# Patient Record
Sex: FEMALE | Race: WHITE | NOT HISPANIC OR LATINO | Employment: OTHER | URBAN - METROPOLITAN AREA
[De-identification: names, ages, dates, MRNs, and addresses within clinical notes are randomized per-mention and may not be internally consistent; named-entity substitution may affect disease eponyms.]

---

## 2020-08-06 ENCOUNTER — OFFICE VISIT (OUTPATIENT)
Dept: PODIATRY | Facility: CLINIC | Age: 65
End: 2020-08-06
Payer: MEDICAID

## 2020-08-06 VITALS
HEIGHT: 64 IN | WEIGHT: 209.19 LBS | DIASTOLIC BLOOD PRESSURE: 67 MMHG | BODY MASS INDEX: 35.71 KG/M2 | SYSTOLIC BLOOD PRESSURE: 116 MMHG | HEART RATE: 86 BPM

## 2020-08-06 DIAGNOSIS — B35.1 ONYCHOMYCOSIS: Primary | ICD-10-CM

## 2020-08-06 DIAGNOSIS — I73.9 PERIPHERAL ARTERIAL DISEASE: ICD-10-CM

## 2020-08-06 DIAGNOSIS — I73.9 ARTERIAL INSUFFICIENCY OF LOWER EXTREMITY: ICD-10-CM

## 2020-08-06 PROCEDURE — 11721 DEBRIDE NAIL 6 OR MORE: CPT | Mod: S$PBB,,, | Performed by: PODIATRIST

## 2020-08-06 PROCEDURE — 99203 OFFICE O/P NEW LOW 30 MIN: CPT | Mod: PBBFAC | Performed by: PODIATRIST

## 2020-08-06 PROCEDURE — 99203 OFFICE O/P NEW LOW 30 MIN: CPT | Mod: 25,S$PBB,, | Performed by: PODIATRIST

## 2020-08-06 PROCEDURE — 99999 PR PBB SHADOW E&M-NEW PATIENT-LVL III: CPT | Mod: PBBFAC,,, | Performed by: PODIATRIST

## 2020-08-06 PROCEDURE — 11721 PR DEBRIDEMENT OF NAILS, 6 OR MORE: ICD-10-PCS | Mod: S$PBB,,, | Performed by: PODIATRIST

## 2020-08-06 PROCEDURE — 11721 DEBRIDE NAIL 6 OR MORE: CPT | Mod: Q8,PBBFAC | Performed by: PODIATRIST

## 2020-08-06 PROCEDURE — 99999 PR PBB SHADOW E&M-NEW PATIENT-LVL III: ICD-10-PCS | Mod: PBBFAC,,, | Performed by: PODIATRIST

## 2020-08-06 PROCEDURE — 99203 PR OFFICE/OUTPT VISIT, NEW, LEVL III, 30-44 MIN: ICD-10-PCS | Mod: 25,S$PBB,, | Performed by: PODIATRIST

## 2020-08-06 RX ORDER — LEVOTHYROXINE SODIUM 125 UG/1
TABLET ORAL
COMMUNITY

## 2020-08-06 RX ORDER — TIOTROPIUM BROMIDE 18 UG/1
CAPSULE ORAL; RESPIRATORY (INHALATION)
COMMUNITY

## 2020-08-06 RX ORDER — ATORVASTATIN CALCIUM 10 MG/1
TABLET, FILM COATED ORAL
COMMUNITY

## 2020-08-06 RX ORDER — RISPERIDONE 3 MG/1
TABLET ORAL
COMMUNITY

## 2020-08-06 RX ORDER — OXYBUTYNIN CHLORIDE 15 MG/1
TABLET, EXTENDED RELEASE ORAL
COMMUNITY

## 2020-08-06 RX ORDER — FLUTICASONE PROPIONATE 50 MCG
1 SPRAY, SUSPENSION (ML) NASAL
COMMUNITY

## 2020-08-06 NOTE — PROGRESS NOTES
Subjective:       Patient ID: Samantha Prescott is a 64 y.o. female.    Chief Complaint: Nail Problem (pt c/o of hallux toe haiving pain 8/10 tennis non diabetic pt pcp Dr. Zuniga.)      HPI: Samantha Prescott presents to the office with the chief complaint of elongated, thickened and dystrophic nail plates to the B/L foot. This patient does have Peripheral Angiopathy. Patient does follow with Primary Care for management of comorbid states. This patient's PMD is Shana Zuniga MD. This patient last saw his/her primary care provider on today.  Patient presents from nursing home with her CNA.  States pains about the right great toe due to excessive thickening.     Review of patient's allergies indicates:  No Known Allergies    Past Medical History:   Diagnosis Date    Anxiety     COPD (chronic obstructive pulmonary disease)     Depression     Hypothyroidism     Schizophrenia     Seizures        History reviewed. No pertinent family history.    Social History     Socioeconomic History    Marital status:      Spouse name: Not on file    Number of children: Not on file    Years of education: Not on file    Highest education level: Not on file   Occupational History    Not on file   Social Needs    Financial resource strain: Not on file    Food insecurity     Worry: Not on file     Inability: Not on file    Transportation needs     Medical: Not on file     Non-medical: Not on file   Tobacco Use    Smoking status: Current Some Day Smoker     Types: Cigarettes    Smokeless tobacco: Never Used   Substance and Sexual Activity    Alcohol use: Not on file    Drug use: Not on file    Sexual activity: Not on file   Lifestyle    Physical activity     Days per week: Not on file     Minutes per session: Not on file    Stress: Not on file   Relationships    Social connections     Talks on phone: Not on file     Gets together: Not on file     Attends Jainism service: Not on file     Active member of club or  "organization: Not on file     Attends meetings of clubs or organizations: Not on file     Relationship status: Not on file   Other Topics Concern    Not on file   Social History Narrative    Not on file       History reviewed. No pertinent surgical history.    Review of Systems   Constitutional: Negative for chills, fatigue and fever.   HENT: Negative for hearing loss.    Eyes: Negative for photophobia and visual disturbance.   Respiratory: Negative for cough, chest tightness, shortness of breath and wheezing.    Cardiovascular: Positive for leg swelling. Negative for chest pain and palpitations.   Gastrointestinal: Negative for constipation, diarrhea, nausea and vomiting.   Endocrine: Negative for cold intolerance and heat intolerance.   Genitourinary: Negative for flank pain.   Musculoskeletal: Positive for gait problem. Negative for neck pain and neck stiffness.   Skin: Negative for wound.   Neurological: Negative for light-headedness and headaches.   Psychiatric/Behavioral: Negative for sleep disturbance.          Objective:   /67   Pulse 86   Ht 5' 4" (1.626 m)   Wt 94.9 kg (209 lb 3.5 oz)   BMI 35.91 kg/m²     Physical Exam  LOWER EXTREMITY PHYSICAL EXAMINATION    NEUROLOGY: Protective sensation is intact via 5.07 [place] Liliam monofilament. Proprioception is intact. Sensation to light touch is intact.     VASCULAR: Capillary refill time is within normal limits. Spider veins are noted to the bilateral lower extremity. Edema is noted, mild, bilateral. Proximal to distal temperature gradient is within normal limits bilateral. Palpation of pulses to the lower extremity is diminished on the left and right. The left dorsalis pedis pulse is 1/4 and on the right is 1/4. The left posterior tibial pulse is 0/4 on the right is 0/4. Hair growth is diminished to absent on the bilateral dorsal foot and at the digits.     DERMATOLOGY: There are nail changes which are consistent with onychomycosis on the left " and right foot. On the left, nails 1, 2, 3, 4 and 5 are thickened, are dystrophic, with yellow discoloration, and with malodorous subungual debris. On the right, nails 1, 2, 3, 4 and 5 are thickened, are dystrophic, with yellow discoloration, and with malodorous subungual debris. These thickened and dystrophic nails are painful to touch and palpation. The remaining nail plates are elongated, and are not suggestive of onychomycosis.     ORTHOPEDIC: Manual Muscle Testing is 5/5 in all planes on the left and right, without pains, with and without resistance. Gait pattern is non-antalgic.    Assessment:     1. Onychomycosis    2. Arterial insufficiency of lower extremity    3. Peripheral arterial disease        Plan:     Onychomycosis    Arterial insufficiency of lower extremity    Peripheral arterial disease      The onychomycotic nail plates, as outlined above, are sharply debrided with double action nail nipper, and/or with the assistance of a mechanical rotary lamont, with removal of all offending nail and nail border(s), for reduction of pains. Nails are reduced in terms of length, width and girth with removal of subungual debris to facilitate pain free weight bearing and ambulation. The elongated nails as outlined in the objective portion of this note, were trimmed to appropriate length, with a double action nail nipper, for alleviation/reduction of pains as well. Follow up in approx. 3-4 months.        Future Appointments   Date Time Provider Department Center   12/10/2020  1:30 PM Eben Bartlett DPM [facility]

## 2020-12-10 ENCOUNTER — OFFICE VISIT (OUTPATIENT)
Dept: PODIATRY | Facility: CLINIC | Age: 65
End: 2020-12-10
Payer: MEDICAID

## 2020-12-10 VITALS — BODY MASS INDEX: 36.81 KG/M2 | WEIGHT: 215.63 LBS | HEIGHT: 64 IN

## 2020-12-10 DIAGNOSIS — I73.9 PERIPHERAL ARTERIAL DISEASE: ICD-10-CM

## 2020-12-10 DIAGNOSIS — I73.9 ARTERIAL INSUFFICIENCY OF LOWER EXTREMITY: ICD-10-CM

## 2020-12-10 DIAGNOSIS — B35.1 ONYCHOMYCOSIS: Primary | ICD-10-CM

## 2020-12-10 PROCEDURE — 99999 PR PBB SHADOW E&M-EST. PATIENT-LVL III: ICD-10-PCS | Mod: PBBFAC,,, | Performed by: PODIATRIST

## 2020-12-10 PROCEDURE — 99499 NO LOS: ICD-10-PCS | Mod: S$PBB,,, | Performed by: PODIATRIST

## 2020-12-10 PROCEDURE — 11721 DEBRIDE NAIL 6 OR MORE: CPT | Mod: S$PBB,,, | Performed by: PODIATRIST

## 2020-12-10 PROCEDURE — 99213 OFFICE O/P EST LOW 20 MIN: CPT | Mod: PBBFAC | Performed by: PODIATRIST

## 2020-12-10 PROCEDURE — 99999 PR PBB SHADOW E&M-EST. PATIENT-LVL III: CPT | Mod: PBBFAC,,, | Performed by: PODIATRIST

## 2020-12-10 PROCEDURE — 99499 UNLISTED E&M SERVICE: CPT | Mod: S$PBB,,, | Performed by: PODIATRIST

## 2020-12-10 PROCEDURE — 11721 PR DEBRIDEMENT OF NAILS, 6 OR MORE: ICD-10-PCS | Mod: S$PBB,,, | Performed by: PODIATRIST

## 2020-12-10 PROCEDURE — 11721 DEBRIDE NAIL 6 OR MORE: CPT | Mod: Q8,PBBFAC | Performed by: PODIATRIST

## 2020-12-10 NOTE — PROGRESS NOTES
Subjective:       Patient ID: Samantha Prescott is a 65 y.o. female.    Chief Complaint: Routine Foot Care (0/10, tennis w/socks, non diabetic, pcp Dr. Zuniga.)      HPI: Samantha Prescott presents to the office with the chief complaint of elongated, thickened and dystrophic nail plates to the B/L foot. This patient does have Peripheral Angiopathy. Patient does follow with Primary Care for management of comorbid states. HELGA Zuniga is PCP. Past last saw this provider on 11/23.  Patient presents from nursing home with her CNA.      Review of patient's allergies indicates:  No Known Allergies    Past Medical History:   Diagnosis Date    Anxiety     COPD (chronic obstructive pulmonary disease)     Depression     Hypothyroidism     Schizophrenia     Seizures        History reviewed. No pertinent family history.    Social History     Socioeconomic History    Marital status:      Spouse name: Not on file    Number of children: Not on file    Years of education: Not on file    Highest education level: Not on file   Occupational History    Not on file   Social Needs    Financial resource strain: Not on file    Food insecurity     Worry: Not on file     Inability: Not on file    Transportation needs     Medical: Not on file     Non-medical: Not on file   Tobacco Use    Smoking status: Current Some Day Smoker     Types: Cigarettes    Smokeless tobacco: Never Used   Substance and Sexual Activity    Alcohol use: Not on file    Drug use: Not on file    Sexual activity: Not on file   Lifestyle    Physical activity     Days per week: Not on file     Minutes per session: Not on file    Stress: Not on file   Relationships    Social connections     Talks on phone: Not on file     Gets together: Not on file     Attends Bahai service: Not on file     Active member of club or organization: Not on file     Attends meetings of clubs or organizations: Not on file     Relationship status: Not on file   Other  "Topics Concern    Not on file   Social History Narrative    Not on file       History reviewed. No pertinent surgical history.    Review of Systems   Constitutional: Negative for chills, fatigue and fever.   HENT: Negative for hearing loss.    Eyes: Negative for photophobia and visual disturbance.   Respiratory: Negative for cough, chest tightness, shortness of breath and wheezing.    Cardiovascular: Positive for leg swelling. Negative for chest pain and palpitations.   Gastrointestinal: Negative for constipation, diarrhea, nausea and vomiting.   Endocrine: Negative for cold intolerance and heat intolerance.   Genitourinary: Negative for flank pain.   Musculoskeletal: Positive for gait problem. Negative for neck pain and neck stiffness.   Skin: Negative for wound.   Neurological: Negative for light-headedness and headaches.   Psychiatric/Behavioral: Negative for sleep disturbance.          Objective:   Ht 5' 4" (1.626 m)   Wt 97.8 kg (215 lb 9.8 oz)   BMI 37.01 kg/m²     Physical Exam  LOWER EXTREMITY PHYSICAL EXAMINATION  DERMATOLOGY: There are nail changes which are consistent with onychomycosis on the left and right foot. On the left, nails 1, 2, 3, 4 and 5 are thickened, are dystrophic, with yellow discoloration, and with malodorous subungual debris. On the right, nails 1, 2, 3, 4 and 5 are thickened, are dystrophic, with yellow discoloration, and with malodorous subungual debris. These thickened and dystrophic nails are painful to touch and palpation. The remaining nail plates are elongated, and are not suggestive of onychomycosis.     VASCULAR: Capillary refill time is within normal limits. Edema is noted, mild, bilateral. Proximal to distal temperature gradient is within normal limits bilateral. The left dorsalis pedis pulse is 1/4 and on the right is 1/4. The left posterior tibial pulse is 0/4 on the right is 0/4. Hair growth is diminished to absent on the bilateral dorsal foot and at the digits. Spider " veins are noted to the bilateral lower extremity.    NEUROLOGY: Sensation to light touch is intact. Proprioception is intact. Sensation to pin prick is intact.     Assessment:     1. Onychomycosis    2. Arterial insufficiency of lower extremity    3. Peripheral arterial disease        Plan:     Onychomycosis    Arterial insufficiency of lower extremity    Peripheral arterial disease      The onychomycotic nail plates, as outlined above, are sharply debrided with double action nail nipper, and/or with the assistance of a mechanical rotary lamont, with removal of all offending nail and nail border(s), for reduction of pains. Nails are reduced in terms of length, width and girth with removal of subungual debris to facilitate pain free weight bearing and ambulation. The elongated nails as outlined in the objective portion of this note, were trimmed to appropriate length, with a double action nail nipper, for alleviation/reduction of pains as well. Follow up in approx. 3-4 months.        Future Appointments   Date Time Provider Department Center   3/8/2021 10:00 AM Eben Bartlett DPM [facility]

## 2023-08-23 ENCOUNTER — HOSPITAL ENCOUNTER (EMERGENCY)
Facility: HOSPITAL | Age: 68
Discharge: HOME | End: 2023-08-23
Attending: EMERGENCY MEDICINE
Payer: MEDICAID

## 2023-08-23 VITALS
HEART RATE: 72 BPM | TEMPERATURE: 98 F | SYSTOLIC BLOOD PRESSURE: 122 MMHG | OXYGEN SATURATION: 99 % | DIASTOLIC BLOOD PRESSURE: 78 MMHG | RESPIRATION RATE: 16 BRPM | HEIGHT: 62 IN | BODY MASS INDEX: 25.03 KG/M2 | WEIGHT: 136 LBS

## 2023-08-23 DIAGNOSIS — R07.9 CHEST PAIN, UNSPECIFIED TYPE: Primary | ICD-10-CM

## 2023-08-23 DIAGNOSIS — R07.89 CHEST WALL PAIN: ICD-10-CM

## 2023-08-23 DIAGNOSIS — R07.9 CHEST PAIN: ICD-10-CM

## 2023-08-23 LAB
ALBUMIN SERPL BCP-MCNC: 3.5 G/DL (ref 3.5–5.2)
ALP SERPL-CCNC: 135 U/L (ref 55–135)
ALT SERPL W/O P-5'-P-CCNC: 18 U/L (ref 10–44)
ANION GAP SERPL CALC-SCNC: 10 MMOL/L (ref 8–16)
AST SERPL-CCNC: 21 U/L (ref 10–40)
BASOPHILS # BLD AUTO: 0.05 K/UL (ref 0–0.2)
BASOPHILS NFR BLD: 0.8 % (ref 0–1.9)
BILIRUB SERPL-MCNC: 0.4 MG/DL (ref 0.1–1)
BNP SERPL-MCNC: 14 PG/ML (ref 0–99)
BUN SERPL-MCNC: 13 MG/DL (ref 8–23)
CALCIUM SERPL-MCNC: 9.7 MG/DL (ref 8.7–10.5)
CHLORIDE SERPL-SCNC: 105 MMOL/L (ref 95–110)
CO2 SERPL-SCNC: 23 MMOL/L (ref 23–29)
CREAT SERPL-MCNC: 0.8 MG/DL (ref 0.5–1.4)
DIFFERENTIAL METHOD: ABNORMAL
EOSINOPHIL # BLD AUTO: 0.2 K/UL (ref 0–0.5)
EOSINOPHIL NFR BLD: 2.6 % (ref 0–8)
ERYTHROCYTE [DISTWIDTH] IN BLOOD BY AUTOMATED COUNT: 13.5 % (ref 11.5–14.5)
EST. GFR  (NO RACE VARIABLE): >60 ML/MIN/1.73 M^2
GLUCOSE SERPL-MCNC: 102 MG/DL (ref 70–110)
HCT VFR BLD AUTO: 34.3 % (ref 37–48.5)
HGB BLD-MCNC: 11.2 G/DL (ref 12–16)
IMM GRANULOCYTES # BLD AUTO: 0.02 K/UL (ref 0–0.04)
IMM GRANULOCYTES NFR BLD AUTO: 0.3 % (ref 0–0.5)
LYMPHOCYTES # BLD AUTO: 2.3 K/UL (ref 1–4.8)
LYMPHOCYTES NFR BLD: 35.6 % (ref 18–48)
MCH RBC QN AUTO: 32.1 PG (ref 27–31)
MCHC RBC AUTO-ENTMCNC: 32.7 G/DL (ref 32–36)
MCV RBC AUTO: 98 FL (ref 82–98)
MONOCYTES # BLD AUTO: 0.6 K/UL (ref 0.3–1)
MONOCYTES NFR BLD: 9 % (ref 4–15)
NEUTROPHILS # BLD AUTO: 3.3 K/UL (ref 1.8–7.7)
NEUTROPHILS NFR BLD: 51.7 % (ref 38–73)
NRBC BLD-RTO: 0 /100 WBC
PLATELET # BLD AUTO: 172 K/UL (ref 150–450)
PMV BLD AUTO: 11 FL (ref 9.2–12.9)
POTASSIUM SERPL-SCNC: 4.1 MMOL/L (ref 3.5–5.1)
PROT SERPL-MCNC: 6.5 G/DL (ref 6–8.4)
RBC # BLD AUTO: 3.49 M/UL (ref 4–5.4)
SODIUM SERPL-SCNC: 138 MMOL/L (ref 136–145)
TROPONIN I SERPL DL<=0.01 NG/ML-MCNC: <0.006 NG/ML (ref 0–0.03)
TROPONIN I SERPL DL<=0.01 NG/ML-MCNC: <0.006 NG/ML (ref 0–0.03)
WBC # BLD AUTO: 6.44 K/UL (ref 3.9–12.7)

## 2023-08-23 PROCEDURE — 99285 EMERGENCY DEPT VISIT HI MDM: CPT | Mod: 25

## 2023-08-23 PROCEDURE — 93010 ELECTROCARDIOGRAM REPORT: CPT | Mod: ,,, | Performed by: INTERNAL MEDICINE

## 2023-08-23 PROCEDURE — 83880 ASSAY OF NATRIURETIC PEPTIDE: CPT | Performed by: EMERGENCY MEDICINE

## 2023-08-23 PROCEDURE — 96375 TX/PRO/DX INJ NEW DRUG ADDON: CPT

## 2023-08-23 PROCEDURE — 84484 ASSAY OF TROPONIN QUANT: CPT | Mod: 91 | Performed by: EMERGENCY MEDICINE

## 2023-08-23 PROCEDURE — 93005 ELECTROCARDIOGRAM TRACING: CPT

## 2023-08-23 PROCEDURE — 80053 COMPREHEN METABOLIC PANEL: CPT | Performed by: EMERGENCY MEDICINE

## 2023-08-23 PROCEDURE — 93010 EKG 12-LEAD: ICD-10-PCS | Mod: ,,, | Performed by: INTERNAL MEDICINE

## 2023-08-23 PROCEDURE — 85025 COMPLETE CBC W/AUTO DIFF WBC: CPT | Performed by: EMERGENCY MEDICINE

## 2023-08-23 PROCEDURE — 63600175 PHARM REV CODE 636 W HCPCS: Performed by: EMERGENCY MEDICINE

## 2023-08-23 PROCEDURE — 96374 THER/PROPH/DIAG INJ IV PUSH: CPT

## 2023-08-23 RX ORDER — ONDANSETRON 2 MG/ML
4 INJECTION INTRAMUSCULAR; INTRAVENOUS
Status: COMPLETED | OUTPATIENT
Start: 2023-08-23 | End: 2023-08-23

## 2023-08-23 RX ORDER — ONDANSETRON 4 MG/1
4 TABLET, FILM COATED ORAL EVERY 6 HOURS
Qty: 30 TABLET | Refills: 0 | Status: SHIPPED | OUTPATIENT
Start: 2023-08-23

## 2023-08-23 RX ORDER — HYDROCODONE BITARTRATE AND ACETAMINOPHEN 5; 325 MG/1; MG/1
1 TABLET ORAL EVERY 6 HOURS PRN
Qty: 20 TABLET | Refills: 0 | Status: SHIPPED | OUTPATIENT
Start: 2023-08-23

## 2023-08-23 RX ORDER — MORPHINE SULFATE 4 MG/ML
4 INJECTION, SOLUTION INTRAMUSCULAR; INTRAVENOUS
Status: COMPLETED | OUTPATIENT
Start: 2023-08-23 | End: 2023-08-23

## 2023-08-23 RX ADMIN — MORPHINE SULFATE 4 MG: 4 INJECTION INTRAVENOUS at 01:08

## 2023-08-23 RX ADMIN — ONDANSETRON 4 MG: 2 INJECTION INTRAMUSCULAR; INTRAVENOUS at 02:08

## 2023-08-23 NOTE — ED PROVIDER NOTES
SCRIBE #1 NOTE: IDulce, am scribing for, and in the presence of, Dede Paz MD. I have scribed the entire note.       History     Chief Complaint   Patient presents with    Chest Pain     From [facility] with nonradiating CP starting at 2200. Tender on palpation.     Review of patient's allergies indicates:  No Known Allergies      History of Present Illness     HPI    8/23/2023, 1:36 AM  History obtained from the patient      History of Present Illness: Samantha Prescott is a 67 y.o. female patient with a PMHx of COPD, hypothyroidism, and seizures who presents to the Emergency Department from [facility] via EMS for evaluation of non-radiating left sided CP which onset at 2200 tonight. The pt reports that she did arm exercises during physical therapy yesterday, and reports soreness to the BUE. The pt did lower extremity exercises today. Symptoms are constant and moderate in severity. No mitigating or exacerbating factors reported. Associated sxs include RLQ abdominal pain. Patient denies any SOB, N/V/D, fever, chills, and all other sxs at this time. Prior Tx includes 325mg Aspirin and NTG administered en route. No further complaints or concerns at this time.       Arrival mode: EMS    PCP: Lizzie Bustos MD        Past Medical History:  Past Medical History:   Diagnosis Date    Anxiety     COPD (chronic obstructive pulmonary disease)     Depression     Hypothyroidism     Schizophrenia     Seizures        Past Surgical History:  History reviewed. No pertinent surgical history.      Family History:  History reviewed. No pertinent family history.    Social History:  Social History     Tobacco Use    Smoking status: Some Days     Current packs/day: 0.00     Types: Cigarettes    Smokeless tobacco: Never   Substance and Sexual Activity    Alcohol use: Not on file    Drug use: Not on file    Sexual activity: Not on file        Review of Systems     Review of Systems   Constitutional:  Negative  for chills and fever.   HENT:  Negative for sore throat.    Respiratory:  Negative for shortness of breath.    Cardiovascular:  Positive for chest pain (left sided).   Gastrointestinal:  Positive for abdominal pain (RLQ). Negative for diarrhea, nausea and vomiting.   Genitourinary:  Negative for dysuria.   Musculoskeletal:  Positive for myalgias (BUE). Negative for back pain.   Skin:  Negative for rash.   Neurological:  Negative for weakness.   Hematological:  Does not bruise/bleed easily.   All other systems reviewed and are negative.     Physical Exam     Initial Vitals [08/23/23 0043]   BP Pulse Resp Temp SpO2   100/69 100 18 98.3 °F (36.8 °C) 97 %      MAP       --          Physical Exam  Nursing Notes and Vital Signs Reviewed.  Constitutional: Patient is in no acute distress. Well-developed and well-nourished.  Head: Atraumatic. Normocephalic.  Eyes: PERRL. EOM intact. Conjunctivae are not pale. No scleral icterus.  ENT: Mucous membranes are moist. Oropharynx is clear and symmetric.    Neck: Supple. Full ROM. No lymphadenopathy.  Cardiovascular: Regular rate. Regular rhythm. No murmurs, rubs, or gallops. Distal pulses are 2+ and symmetric.  Pulmonary/Chest: No respiratory distress. Clear to auscultation bilaterally. No wheezing or rales. Left chest wall tender to palpation. Left mastectomy surgical site, no signs of infection, no cellulitis, no zoster.   Abdominal: Soft and non-distended.  There is no tenderness.  No rebound, guarding, or rigidity. Good bowel sounds.  Genitourinary: No CVA tenderness  Musculoskeletal: Moves all extremities. No obvious deformities. No edema. No calf tenderness.  Skin: Warm and dry.  Neurological:  Alert, awake, and appropriate.  Normal speech.  No acute focal neurological deficits are appreciated.  Psychiatric: Normal affect. Good eye contact. Appropriate in content.     ED Course   Procedures  ED Vital Signs:  Vitals:    08/23/23 0043 08/23/23 0124 08/23/23 0159 08/23/23 0223    BP: 100/69   (!) 103/51   Pulse: 100 80  80   Resp: 18  18 16   Temp: 98.3 °F (36.8 °C)      TempSrc: Oral      SpO2: 97%   97%   Weight:  61.7 kg (136 lb)      08/23/23 0234 08/23/23 0238 08/23/23 0402 08/23/23 0423   BP:   (!) 100/52 (!) 99/54   Pulse: 80 78 76 77   Resp: (!) 22 18 20 17   Temp:       TempSrc:       SpO2:  99% 97% 98%   Weight:        08/23/23 0443 08/23/23 0458 08/23/23 0523 08/23/23 0601   BP: (!) 95/50 (!) 94/50 (!) 105/55    Pulse: 78 76 75 75   Resp: 17 12 11 20   Temp:       TempSrc:       SpO2: 95% 96% 97% 100%   Weight:        08/23/23 0602   BP: 133/69   Pulse:    Resp:    Temp:    TempSrc:    SpO2:    Weight:        Abnormal Lab Results:  Labs Reviewed   CBC W/ AUTO DIFFERENTIAL - Abnormal; Notable for the following components:       Result Value    RBC 3.49 (*)     Hemoglobin 11.2 (*)     Hematocrit 34.3 (*)     MCH 32.1 (*)     All other components within normal limits   COMPREHENSIVE METABOLIC PANEL   TROPONIN I   B-TYPE NATRIURETIC PEPTIDE   TROPONIN I        All Lab Results:  Results for orders placed or performed during the hospital encounter of 08/23/23   CBC auto differential   Result Value Ref Range    WBC 6.44 3.90 - 12.70 K/uL    RBC 3.49 (L) 4.00 - 5.40 M/uL    Hemoglobin 11.2 (L) 12.0 - 16.0 g/dL    Hematocrit 34.3 (L) 37.0 - 48.5 %    MCV 98 82 - 98 fL    MCH 32.1 (H) 27.0 - 31.0 pg    MCHC 32.7 32.0 - 36.0 g/dL    RDW 13.5 11.5 - 14.5 %    Platelets 172 150 - 450 K/uL    MPV 11.0 9.2 - 12.9 fL    Immature Granulocytes 0.3 0.0 - 0.5 %    Gran # (ANC) 3.3 1.8 - 7.7 K/uL    Immature Grans (Abs) 0.02 0.00 - 0.04 K/uL    Lymph # 2.3 1.0 - 4.8 K/uL    Mono # 0.6 0.3 - 1.0 K/uL    Eos # 0.2 0.0 - 0.5 K/uL    Baso # 0.05 0.00 - 0.20 K/uL    nRBC 0 0 /100 WBC    Gran % 51.7 38.0 - 73.0 %    Lymph % 35.6 18.0 - 48.0 %    Mono % 9.0 4.0 - 15.0 %    Eosinophil % 2.6 0.0 - 8.0 %    Basophil % 0.8 0.0 - 1.9 %    Differential Method Automated    Comprehensive metabolic panel   Result  Value Ref Range    Sodium 138 136 - 145 mmol/L    Potassium 4.1 3.5 - 5.1 mmol/L    Chloride 105 95 - 110 mmol/L    CO2 23 23 - 29 mmol/L    Glucose 102 70 - 110 mg/dL    BUN 13 8 - 23 mg/dL    Creatinine 0.8 0.5 - 1.4 mg/dL    Calcium 9.7 8.7 - 10.5 mg/dL    Total Protein 6.5 6.0 - 8.4 g/dL    Albumin 3.5 3.5 - 5.2 g/dL    Total Bilirubin 0.4 0.1 - 1.0 mg/dL    Alkaline Phosphatase 135 55 - 135 U/L    AST 21 10 - 40 U/L    ALT 18 10 - 44 U/L    eGFR >60 >60 mL/min/1.73 m^2    Anion Gap 10 8 - 16 mmol/L   Troponin I #1   Result Value Ref Range    Troponin I <0.006 0.000 - 0.026 ng/mL   BNP   Result Value Ref Range    BNP 14 0 - 99 pg/mL   Troponin I   Result Value Ref Range    Troponin I <0.006 0.000 - 0.026 ng/mL         Imaging Results:  Imaging Results              X-Ray Chest AP Portable (Preliminary result)  Result time 08/23/23 02:20:28      Wet Read by Dede Paz MD (08/23/23 02:20:28, '[place] - Emergency Dept., Emergency Medicine)    No infiltrate or pneumonia or cardiomegaly or effusion                                     The EKG was ordered, reviewed, and independently interpreted by the ED provider.  Interpretation time: 00:53  Rate: 78 BPM  Rhythm: normal sinus rhythm  Interpretation: No acute ST changes. No STEMI.           The Emergency Provider reviewed the vital signs and test results, which are outlined above.     ED Discussion     5:44 AM: Reassessed pt at this time. Discussed with pt all pertinent ED information and results. Discussed pt dx and plan of tx. Gave pt all f/u and return to the ED instructions. All questions and concerns were addressed at this time. Pt expresses understanding of information and instructions, and is comfortable with plan to discharge. Pt is stable for discharge.    I discussed with patient and/or family/caretaker that evaluation in the ED does not suggest any emergent or life threatening medical conditions requiring immediate intervention beyond what was  provided in the ED, and I believe patient is safe for discharge.  Regardless, an unremarkable evaluation in the ED does not preclude the development or presence of a serious of life threatening condition. As such, patient was instructed to return immediately for any worsening or change in current symptoms.      ED Course as of 08/23/23 0605   Wed Aug 23, 2023   0348 Workup within normal limits so far.  I suspect the patient just has musculoskeletal pain she had exquisite pain on exam.  We will get a repeat troponin at 4:00 a.m. if it is negative will plan to discharge her back to the nursing home [TD]      ED Course User Index  [TD] Dede Paz MD     Medical Decision Making  Patient here for left-sided chest pain which she would exquisite pain on palpation    Amount and/or Complexity of Data Reviewed  Labs: ordered. Decision-making details documented in ED Course.  Radiology: ordered and independent interpretation performed. Decision-making details documented in ED Course.  ECG/medicine tests: ordered. Decision-making details documented in ED Course.  Discussion of management or test interpretation with external provider(s): Workup within normal limits, x-ray with no pneumonia she did not have any ST-elevation and lab work was normal for patient she would 2 troponins which were normal.  She was given morphine  in the emergency room and did very well.  Plan to discharge back to the nursing home with a few [place] and Zofran    Risk  Prescription drug management.           Medical Decision Making:   Differential Diagnosis:   Heart attack, angina, pneumonia, asthma, infection, aortic dissection, pneumothorax, pericarditis , pleural effusion, GERD, pancreatitis, gallstone pain, cholelithiasis or cholecystitis, esophageal rupture, bowel perforation, gastritis, nonspecific chest pain, musculoskeletal chest pain, costochondritis        ED Medication(s):  Medications   ondansetron injection 4 mg (4 mg Intravenous  Given 8/23/23 0200)   morphine injection 4 mg (4 mg Intravenous Given 8/23/23 0159)       New Prescriptions    HYDROCODONE-ACETAMINOPHEN (NORCO) 5-325 MG PER TABLET    Take 1 tablet by mouth every 6 (six) hours as needed for Pain.    ONDANSETRON (ZOFRAN) 4 MG TABLET    Take 1 tablet (4 mg total) by mouth every 6 (six) hours.        Follow-up Information       Lizzie Bustos MD In 1 day.    Specialty: Internal Medicine  Contact information:  [address] [facility] [address]  [address]  [place] [address] [phone]                                 Scribe Attestation:   Scribe #1: I performed the above scribed service and the documentation accurately describes the services I performed. I attest to the accuracy of the note.     Attending:   Physician Attestation Statement for Scribe #1: I, Dede Paz MD, personally performed the services described in this documentation, as scribed by Dulce Arriaga, in my presence, and it is both accurate and complete.           Clinical Impression       ICD-10-CM ICD-9-CM   1. Chest pain, unspecified type  R07.9 786.50   2. Chest pain  R07.9 786.50   3. Chest wall pain  R07.89 786.52       Disposition:   Disposition: Discharged  Condition: Stable         Dede Paz MD  08/23/23 0691

## 2023-08-23 NOTE — ED NOTES
Bed: 18  Expected date: 8/23/23  Expected time: 12:26 AM  Means of arrival: Ambulance Service  Comments: